# Patient Record
Sex: FEMALE | Race: WHITE | Employment: FULL TIME | URBAN - METROPOLITAN AREA
[De-identification: names, ages, dates, MRNs, and addresses within clinical notes are randomized per-mention and may not be internally consistent; named-entity substitution may affect disease eponyms.]

---

## 2018-05-14 ENCOUNTER — OFFICE VISIT (OUTPATIENT)
Dept: URGENT CARE | Facility: URGENT CARE | Age: 33
End: 2018-05-14
Payer: COMMERCIAL

## 2018-05-14 VITALS
HEART RATE: 73 BPM | WEIGHT: 177.5 LBS | DIASTOLIC BLOOD PRESSURE: 71 MMHG | SYSTOLIC BLOOD PRESSURE: 119 MMHG | TEMPERATURE: 98.3 F

## 2018-05-14 DIAGNOSIS — R07.0 THROAT PAIN: Primary | ICD-10-CM

## 2018-05-14 LAB
DEPRECATED S PYO AG THROAT QL EIA: NORMAL
SPECIMEN SOURCE: NORMAL

## 2018-05-14 PROCEDURE — 87081 CULTURE SCREEN ONLY: CPT | Performed by: PHYSICIAN ASSISTANT

## 2018-05-14 PROCEDURE — 99203 OFFICE O/P NEW LOW 30 MIN: CPT | Performed by: PHYSICIAN ASSISTANT

## 2018-05-14 PROCEDURE — 87880 STREP A ASSAY W/OPTIC: CPT | Performed by: PHYSICIAN ASSISTANT

## 2018-05-14 NOTE — PROGRESS NOTES
SUBJECTIVE:  Nara Salazar is a 32 year old female with a chief complaint of sore throat, upset stomach and V  this morning.  Worried about strep.  Onset of symptoms was 2 day(s) ago.    Course of illness: gradual onset and still present.  Severity mild  Current and Associated symptoms: negative other than stated above  Treatment measures tried include Tylenol/Ibuprofen, Fluids and Rest.  Predisposing factors include None.    Hx of asthma with no current sx    No past medical history on file.  No current outpatient prescriptions on file.     Social History   Substance Use Topics     Smoking status: Never Smoker     Smokeless tobacco: Never Used     Alcohol use Not on file       ROS:  Review of systems negative except as stated above.    OBJECTIVE:   /71 (BP Location: Right arm, Patient Position: Chair, Cuff Size: Adult Large)  Pulse 73  Temp 98.3  F (36.8  C) (Tympanic)  Wt 177 lb 8 oz (80.5 kg)  GENERAL APPEARANCE: healthy, alert and no distress  EYES: EOMI,  PERRL, conjunctiva clear  HENT: TM's normal bilaterally, nose and mouth without erythema, ulcers or lesions and oral mucous membranes moist, no erythema noted  NECK: supple, non-tender to palpation, no adenopathy noted  RESP: lungs clear to auscultation - no rales, rhonchi or wheezes  CV: regular rates and rhythm, normal S1 S2, no murmur noted  ABDOMEN:  soft, nontender, no HSM or masses and bowel sounds normal  SKIN: no suspicious lesions or rashes    Rapid Strep test is negative; await throat culture results.    ASSESSMENT:   Throat pain    PLAN:   Culture pending.  .   Symptomatic treat with gargles, lozenges, and OTC analgesic as needed. Follow-up with primary clinic if not improving.

## 2018-05-14 NOTE — LETTER
[facility]  [address]  [address]  [place] [address]  [phone]      May 14, 2018    RE:  Nara Salazar                                                                                                                                                       [address]    [place] [address]            To whom it may concern:    Nara Salazar is under my professional care for illness.  Patient has been seen and evaluated.  Please excuse from missed work for the past 2 shifts due to the illness.    May return after with no restrictions.        Sincerely,        Carmen Clarke    [facility]

## 2018-05-15 LAB
BACTERIA SPEC CULT: NORMAL
SPECIMEN SOURCE: NORMAL

## 2020-01-01 NOTE — MR AVS SNAPSHOT
"              After Visit Summary   2018    Nara Salazar    MRN: [id number]           Patient Information     Date Of Birth          1985        Visit Information        Provider Department      2018 2:40 PM Carmen Clarke PA-C [facility]        Today's Diagnoses     Throat pain    -  1       Follow-ups after your visit        Who to contact     If you have questions or need follow up information about today's clinic visit or your schedule please contact [facility] directly at [phone].  Normal or non-critical lab and imaging results will be communicated to you by [employer], letter or phone within 4 business days after the clinic has received the results. If you do not hear from us within 7 days, please contact the clinic through [employer] or phone. If you have a critical or abnormal lab result, we will notify you by phone as soon as possible.  Submit refill requests through [employer] or call your pharmacy and they will forward the refill request to us. Please allow 3 business days for your refill to be completed.          Additional Information About Your Visit        MyChart Information     [employer] lets you send messages to your doctor, view your test results, renew your prescriptions, schedule appointments and more. To sign up, go to [email or web address] . Click on \"Log in\" on the left side of the screen, which will take you to the Welcome page. Then click on \"Sign up Now\" on the right side of the page.     You will be asked to enter the access code listed below, as well as some personal information. Please follow the directions to create your username and password.     Your access code is: [email or web address]  Expires: 8/15/2018  6:50 PM     Your access code will  in 90 days. If you need help or a new code, please call your [place] clinic or [phone].        Care EveryWhere ID     This is your Care EveryWhere ID. This could be used by other " organizations to access your [place] medical records  [id number]        Your Vitals Were     Pulse Temperature                73 98.3  F (36.8  C) (Tympanic)           Blood Pressure from Last 3 Encounters:   05/14/18 119/71    Weight from Last 3 Encounters:   05/14/18 177 lb 8 oz (80.5 kg)              We Performed the Following     Beta strep group A culture     Strep, Rapid Screen        Primary Care Provider Office Phone # Fax #    Radha [facility] [phone] [address] [address]        Equal Access to Services     [facility]: Hadii aad ku hadasho Soomaali, waaxda luqadaha, qaybta kaalmada adeegyada, saundra teresa . So [facility] [phone].    ATENCIÓN: Si habla español, tiene a rodriguez disposición servicios gratuitos de asistencia lingüística. [facility] [phone].    We comply with applicable federal civil rights laws and [place] laws. We do not discriminate on the basis of race, color, national origin, age, disability, sex, sexual orientation, or gender identity.            Thank you!     Thank you for choosing [facility]  for your care. Our goal is always to provide you with excellent care. Hearing back from our patients is one way we can continue to improve our services. Please take a few minutes to complete the written survey that you may receive in the mail after your visit with us. Thank you!             Your Updated Medication List - Protect others around you: Learn how to safely use, store and throw away your medicines at www.disposemymeds.org.      Notice  As of 5/14/2018 11:59 PM    You have not been prescribed any medications.       Statement Selected

## 2020-02-04 ENCOUNTER — OFFICE VISIT (OUTPATIENT)
Dept: URGENT CARE | Facility: URGENT CARE | Age: 35
End: 2020-02-04
Payer: OTHER MISCELLANEOUS

## 2020-02-04 VITALS
TEMPERATURE: 98.1 F | OXYGEN SATURATION: 99 % | DIASTOLIC BLOOD PRESSURE: 72 MMHG | HEART RATE: 74 BPM | WEIGHT: 190 LBS | SYSTOLIC BLOOD PRESSURE: 100 MMHG

## 2020-02-04 DIAGNOSIS — T30.0 BURN, FIRST DEGREE: Primary | ICD-10-CM

## 2020-02-04 PROCEDURE — 99213 OFFICE O/P EST LOW 20 MIN: CPT | Performed by: PHYSICIAN ASSISTANT

## 2020-02-04 RX ORDER — ALBUTEROL SULFATE 90 UG/1
2 AEROSOL, METERED RESPIRATORY (INHALATION)
COMMUNITY
Start: 2012-10-04

## 2020-02-04 RX ORDER — FLUTICASONE PROPIONATE 50 MCG
SPRAY, SUSPENSION (ML) NASAL
COMMUNITY
Start: 2012-09-01

## 2020-02-04 NOTE — PATIENT INSTRUCTIONS
Patient Education     First-Degree Burn  A burn occurs when skin is exposed to too much heat, sun, or harsh chemicals. A first-degree burn (superficial burn) causes mainly redness. It heals in a few days.  Home care  Follow these guidelines when caring for yourself at home:    Use pain medicine as directed. You may use over-the-counter medicine to control pain if no pain medicine was prescribed. If you have chronic liver or kidney disease, talk with your healthcare provider before using acetaminophen or ibuprofen. Also talk with your provider if you've had a stomach ulcer or GI bleeding.    On the first day, you may put a cool compress on the burn to relieve pain. You can use a small towel soaked in cool water as a cool compress.    Numbing medicines for sunburn can be used for temporary relief of the burning feeling. These medicines include lidocaine or benzocaine. You don t need a prescription for them.    Moisturizers with aloe vera can help soothe the burn.    Don't pick or scratch at the affected areas. Use over-the-counter medicines like diphenhydramine for itching. This medicine is taken by mouth.    Since you don't have an open wound, you don't need to use antibiotic cream or ointment. Sometimes an infection may occur even with proper treatment. Be sure to check the burn daily for the signs of infection listed below.    Wear a hat, sunscreen, and long sleeves while in the sun.    Wear loose-fitting clothing until the burn heals.  Follow-up care  Follow up with your healthcare provider, or as advised. Most first-degree burns heal well without complications.  When to seek medical advice  Call your healthcare provider right away if any of these signs of infection occur:    Fever of 100.4 F (38 C) or higher, or as directed by your healthcare provider    Pain gets worse    Redness or swelling gets worse    Pus comes from the burn    Red streaks in your skin come from the burn    Wound doesn't appear to be  healing  Date Last Reviewed: 12/1/2016 2000-2019 [employer]. [address] [place] [address], [place] [address]. All rights reserved. This information is not intended as a substitute for professional medical care. Always follow your healthcare professional's instructions.

## 2020-02-04 NOTE — PROGRESS NOTES
EMP:[facility]  DOI:2/4/20 at approximately 2am        SUBJECTIVE:  Nara Salazar is a 34 year old female who presents to the clinic today for a rash.  Onset of rash was 7 hour(s) ago.   Rash is sudden onset.  Location of the rash: hand.  Quality/symptoms of rash: burning   Symptoms are mild and rash seems to be stable.  Previous history of a similar rash? No  Recent exposure history: burn at work from hot tray    Associated symptoms include: nothing.    No past medical history on file.  Current Outpatient Medications   Medication Sig Dispense Refill     albuterol (PROAIR HFA/PROVENTIL HFA/VENTOLIN HFA) 108 (90 Base) MCG/ACT inhaler Inhale 2 puffs into the lungs       budesonide (PULMICORT FLEXHALER) 90 MCG/ACT inhaler Inhale 2 Puffs into the lungs two times a day.       cetirizine HCl 10 MG CAPS        fluticasone (FLONASE) 50 MCG/ACT nasal spray USE 2 SPRAYS IN EACH NOSTRIL ONCE DAILY. SHAKE WELL BEFORE USE.       Social History     Tobacco Use     Smoking status: Never Smoker     Smokeless tobacco: Never Used   Substance Use Topics     Alcohol use: Not on file       ROS:  10 point ROS negative except as listed above      EXAM:   /72   Pulse 74   Temp 98.1  F (36.7  C) (Tympanic)   Wt 86.2 kg (190 lb)   SpO2 99%   GENERAL: alert, no acute distress.  SKIN: mild first degree burn to palm of hand, no visible rash  GENERAL APPEARANCE: healthy, alert and no distress  EYES: EOMI,  PERRL, conjunctiva clear  NECK: supple, non-tender to palpation, no adenopathy noted  RESP: lungs clear to auscultation - no rales, rhonchi or wheezes  CV: regular rates and rhythm, normal S1 S2, no murmur noted    ASSESSMENT:  (T30.0) Burn, first degree  (primary encounter diagnosis)  Comment: no evidence of tissue damage  Plan: continue to use cool water to area, follow up with changes    Follow up with PCP if symptoms worsen or fail to improve      Patient Instructions     Patient Education     First-Degree Burn  A burn occurs when  skin is exposed to too much heat, sun, or harsh chemicals. A first-degree burn (superficial burn) causes mainly redness. It heals in a few days.  Home care  Follow these guidelines when caring for yourself at home:    Use pain medicine as directed. You may use over-the-counter medicine to control pain if no pain medicine was prescribed. If you have chronic liver or kidney disease, talk with your healthcare provider before using acetaminophen or ibuprofen. Also talk with your provider if you've had a stomach ulcer or GI bleeding.    On the first day, you may put a cool compress on the burn to relieve pain. You can use a small towel soaked in cool water as a cool compress.    Numbing medicines for sunburn can be used for temporary relief of the burning feeling. These medicines include lidocaine or benzocaine. You don t need a prescription for them.    Moisturizers with aloe vera can help soothe the burn.    Don't pick or scratch at the affected areas. Use over-the-counter medicines like diphenhydramine for itching. This medicine is taken by mouth.    Since you don't have an open wound, you don't need to use antibiotic cream or ointment. Sometimes an infection may occur even with proper treatment. Be sure to check the burn daily for the signs of infection listed below.    Wear a hat, sunscreen, and long sleeves while in the sun.    Wear loose-fitting clothing until the burn heals.  Follow-up care  Follow up with your healthcare provider, or as advised. Most first-degree burns heal well without complications.  When to seek medical advice  Call your healthcare provider right away if any of these signs of infection occur:    Fever of 100.4 F (38 C) or higher, or as directed by your healthcare provider    Pain gets worse    Redness or swelling gets worse    Pus comes from the burn    Red streaks in your skin come from the burn    Wound doesn't appear to be healing  Date Last Reviewed: 12/1/2016 2000-2019 [employer]. [address] [place] [address], [place] [address]. All rights reserved. This information is not intended as a substitute for professional medical care. Always follow your healthcare professional's instructions.

## 2020-03-11 ENCOUNTER — HEALTH MAINTENANCE LETTER (OUTPATIENT)
Age: 35
End: 2020-03-11

## 2020-03-15 ENCOUNTER — VIRTUAL VISIT (OUTPATIENT)
Dept: FAMILY MEDICINE | Facility: OTHER | Age: 35
End: 2020-03-15

## 2020-03-15 NOTE — PROGRESS NOTES
"Date: 03/15/2020 12:23:09  Clinician: Jessica Guerrero  Clinician NPI: [id number]  Patient: Nara Salazar  Patient : 1985  Patient Address: [address] [place] [address]  Patient Phone: [phone]  Visit Protocol: URI  Patient Summary:  Nara is a 34 year old ( : 1985 ) female who initiated a Visit for COVID-19 (Coronavirus) evaluation and screening. When asked the question \"Please sign me up to receive news, health information and promotions. \", Nara responded \"No\".    Nara states her symptoms started gradually 3-6 days ago. After her symptoms started, they improved and then got worse again.   Her symptoms consist of malaise, a headache, facial pain or pressure, a sore throat, and nasal congestion. Nara also feels feverish but was unable to measure her temperature.   Symptom details     Nasal secretions: The color of her mucus is yellow, clear, and white.    Sore throat: Nara reports having mild throat pain (1-3 on a 10 point pain scale), does not have exudate on her tonsils, and can swallow liquids. She is not sure if the lymph nodes in her neck are enlarged. A rash has not appeared on the skin since the sore throat started.     Facial pain or pressure: The facial pain or pressure does not feel worse when bending or leaning forward.     Headache: She states the headache is mild (1-3 on a 10 point pain scale).      Nara denies having ear pain, rhinitis, myalgias, chills, wheezing, cough, and teeth pain. She also denies having a sinus infection within the past year, having recent facial or sinus surgery in the past 60 days, and taking antibiotic medication for the symptoms. She is not experiencing dyspnea.   Precipitating events  Within the past week, Nara has not been exposed to someone with strep throat.   Pertinent COVID-19 (Coronavirus) information  Nara has traveled internationally or to the areas where COVID-19 (Coronavirus) is widespread in the last 14 days before the start " of her symptoms. Countries or locations traveled as reported by the patient (free text): [facility]   Nara has not had close contact with a suspected or laboratory-confirmed COVID-19 patient within 14 days of symptom onset.   Nara is not a healthcare worker and does not work in a healthcare facility.   Pertinent medical history  Nara does not get yeast infections when she takes antibiotics.   Nara needs a return to work/school note.   Weight: 190 lbs   Nara does not smoke or use smokeless tobacco.   She denies pregnancy and denies breastfeeding. She has menstruated in the past month.   Weight: 190 lbs    MEDICATIONS: No current medications, ALLERGIES: Novocain  Clinician Response:  Dear Nara,  Based on the information provided, you have a viral upper respiratory infection, otherwise known as a cold. Symptoms vary from person to person, but can include sneezing, coughing, a runny nose, sore throat, and headache and range from mild to severe.  Unfortunately, there are no medications that can cure a cold, so treatment is focused on controlling symptoms as much as possible. Most people gradually feel better until symptoms are gone in 1-2 weeks.  Medication information  Because you have a viral infection, antibiotics will not help you get better. Treating a viral infection with antibiotics could actually make you feel worse.  Unless you are allergic to the over-the-counter medication(s) below, I recommend using:       Acetaminophen (Tylenol or store brand) oral tablet. Take 1-2 tablets by mouth every 4-6 hours to help with the discomfort.      Ibuprofen (Advil or store brand) 200 mg oral tablet. Take 1-3 tablets (200-600 mg) by mouth every 8 hours to help with the discomfort. Make sure to take the ibuprofen with food. Do not exceed 2400 mg in 24 hours.    An antihistamine such as Benadryl, Claritin, or store brand.    A decongestant such as Sudafed PE or store brand.     Over-the-counter medications do not  require a prescription. Ask the pharmacist if you have any questions.  Self care  The following tips will keep you as comfortable as possible while you recover:     Rest    Drink plenty of water and other liquids    Take a hot shower to loosen congestion    Use throat lozenges    Gargle with warm salt water (1/4 teaspoon of salt per 8 ounce glass of water)    Suck on frozen items such as popsicles or ice cubes    Drink hot tea with lemon and honey     When to seek care  Please be seen in a clinic or urgent care if new symptoms develop, or symptoms become worse.  Call 911 or go to the emergency room if you feel that your throat is closing off, you suddenly develop a rash, you are unable to swallow fluids, you are drooling, or you are having difficulty breathing.  Additional treatment plan   Dear Nara,  Based on the information you have provided, it does not appear you need Coronavirus (COVID-19) testing.   At this time, we recommend testing primarily for those people who have symptoms of cough and fever and have either traveled to a known area of infection or have been exposed to someone with laboratory confirmed Coronavirus by close contact.   Coronavirus - General Information:   The coronavirus infection starts within 14 days of an exposure.  Symptoms are those of a respiratory infection (such as fever, cough).   If you have not had symptoms by day 15, you should be considered uninfected by coronavirus.   Coronavirus - Symptoms:    The coronavirus can cause a respiratory illness, such as bronchitis or pneumonia.  The most common symptoms are: cough, fever, and shortness of breath.   Other symptoms are: body aches, chills, diarrhea, fatigue, headache, runny nose, and sore throat   Coronavirus - Exposure Risk Factors:   Exposure to a person who has been diagnosed with coronavirus.  Travel from an area with recent local transmission of coronavirus.  The CDC (www.cdc.gov) has the most up-to-date list of where the  coronavirus outbreak is occurring.   Coronavirus - Spreading:    The virus likely spreads through respiratory droplets produced when a person coughs or sneezes. These respiratory droplets can travel approximately 6 feet and can remain on surfaces. Common disinfectants will kill the virus.  The CDC currently does not recommend healthy people wear masks.   Coronavirus - Protect Yourself:    Avoid close contact with people known to have this new coronavirus infection.  Wash hands often with soap and water or alcohol-based hand .  Avoid touching the eyes, nose or mouth.   Thank you for limiting contact with others, wearing a simple mask to cover your cough, practice good hand hygiene habits and accessing our virtual services where possible to limit the spread of this virus.  For more information about COVID19 and options for caring for yourself at home, please visit the CDC website at https://www.cdc.gov/coronavirus/2019-ncov/about/steps-when-sick.html   For more options for care at [facility], please visit our website at [email or web address]     Diagnosis: Cough  Diagnosis ICD: R05

## 2020-03-18 ENCOUNTER — VIRTUAL VISIT (OUTPATIENT)
Dept: FAMILY MEDICINE | Facility: OTHER | Age: 35
End: 2020-03-18

## 2020-03-18 NOTE — PROGRESS NOTES
"Date: 2020 14:39:08  Clinician: Merna Maxwell  Clinician NPI: [id number]  Patient: Nara Salazar  Patient : 1985  Patient Address: [address] [place] [id number]  Patient Phone: [phone]  Visit Protocol: URI  Patient Summary:  Nara is a 34 year old ( : 1985 ) female who initiated a Visit for cold, sinus infection, or influenza. When asked the question \"Please sign me up to receive news, health information and promotions. \", Nara responded \"No\".    Nara states her symptoms started gradually 3-6 days ago. After her symptoms started, they improved and then got worse again.   Her symptoms consist of a sore throat, a cough, nasal congestion, ear pain, malaise, a headache, rhinitis, enlarged lymph nodes, facial pain or pressure, myalgia, and tooth pain. She is experiencing difficulty breathing due to nasal congestion but she is not short of breath.   Symptom details     Nasal secretions: The color of her mucus is clear, yellow, and white.    Cough: Nara coughs every 5-10 minutes and her cough is not more bothersome at night. Phlegm comes into her throat when she coughs. She believes her cough is caused by post-nasal drip. The color of the phlegm is yellow, white, and clear.     Sore throat: Nara reports having moderate throat pain (4-6 on a 10 point pain scale), does not have exudate on her tonsils, and can swallow liquids. The lymph nodes in her neck are enlarged. A rash has not appeared on the skin since the sore throat started.     Facial pain or pressure: The facial pain or pressure does not feel worse when bending or leaning forward.     Headache: She states the headache is mild (1-3 on a 10 point pain scale).     Tooth pain: The tooth pain is not caused by a cavity, recent dental work, or other mouth problems.      Nara denies having wheezing, fever, and chills. She also denies taking antibiotic medication for the symptoms, having a sinus infection within the past year, " and having recent facial or sinus surgery in the past 60 days.   Precipitating events  Within the past week, Nara has not been exposed to someone with strep throat. She has not recently been exposed to someone with influenza. Nara has not been in close contact with any high risk individuals.   Pertinent COVID-19 (Coronavirus) information  Nara has traveled internationally or to the areas where COVID-19 (Coronavirus) is widespread in the last 14 days before the start of her symptoms. Countries or locations traveled as reported by the patient (free text): [facility], [facility] and [facility]   Nara has not had a close contact with a laboratory-confirmed COVID-19 patient within 14 days of symptom onset. She also has not had a close contact with a suspected COVID-19 patient within 14 days of symptom onset.   Nara is not a healthcare worker and does not work in a healthcare facility.   Pertinent medical history  Nara does not get yeast infections when she takes antibiotics.   Nara needs a return to work/school note.   Weight: 190 lbs   Nara does not smoke or use smokeless tobacco.   She denies pregnancy and denies breastfeeding. She has menstruated in the past month.   Additional information as reported by the patient (free text): I did this assessment on Sunday and was told it's just a cold, but I have been sick since Friday with my symptoms changing from a sore throat to sinus cold back to sore throat and now back to sinus cold. Is this just a tough cold I can't seem to shake or should I be worried? I feel mostly better, but I tried going to work today and after my shift I felt sick a over again.   Weight: 190 lbs    MEDICATIONS: No current medications, ALLERGIES: Novocain  Clinician Response:  Dear Nara,   Based on the information you have provided, you do have symptoms that are consistent with Coronavirus (COVID-19).  The coronavirus causes mild to severe respiratory illness with the most common symptoms  including fever, cough and difficulty breathing. Unfortunately, many viruses cause similar symptoms and it can be difficult to distinguish between viruses, especially in mild cases, so we are presuming that anyone with cough or fever has coronavirus at this time.  Coronavirus/COVID-19 has reached the point of community spread in [place], meaning that we are finding the virus in people with no known exposure risk for leslie the virus. Given the increasing commonness of coronavirus in the community we are no longer testing patients who are not critically ill.  If you are a health care worker, you should refer to your employee health office for instructions about returning to work.  For everyone else who has cough or fever, you should assume you are infected with coronavirus. Accordingly, you should self-quarantine for seven days from the first day your symptoms started OR 72 hours after your cough and fever completely resolve - WHICHEVER is LONGER. You should call if you find increasing shortness of breath, wheezing or sustained fever above 101.5. If you are significantly short of breath or experience chest pain you should call 911 or report to the nearest emergency department for urgent evaluation.    Isolate yourself at home.   Do Not allow any visitors  Do Not go to work or school  Do Not go to [employer],  centers, shopping, or other public places.  Do Not shake hands.  Avoid close contact with others (hugging, kissing).   Protect Others:    Cover Your Mouth and Nose with a mask, disposable tissue or wash cloth to avoid spreading germs to others.  Wash your hands and face frequently with soap and water.   If you develop significant shortness of breath that prevents you from doing normal activities, please call 911 or proceed to the nearest emergency room and alert them immediately that you have been in self-isolation for possible coronavirus.   For more information about COVID19 and options for caring  for yourself at home, please visit the CDC website at https://www.cdc.gov/coronavirus/2019-ncov/about/steps-when-sick.htmlFor more options for care at [facility], please visit our website at [email or web address]     Diagnosis: Pain in throat  Diagnosis ICD: R07.0

## 2020-03-23 ENCOUNTER — VIRTUAL VISIT (OUTPATIENT)
Dept: FAMILY MEDICINE | Facility: OTHER | Age: 35
End: 2020-03-23

## 2020-03-23 NOTE — PROGRESS NOTES
"Date: 2020 14:22:50  Clinician: Arleth Stiles  Clinician NPI: [id number]  Patient: Nara Salazar  Patient : 1985  Patient Address: [address] [place] [address]  Patient Phone: [phone]  Visit Protocol: URI  Patient Summary:  Nara is a 34 year old ( : 1985 ) female who initiated a Visit for cold, sinus infection, or influenza. When asked the question \"Please sign me up to receive news, health information and promotions. \", Nara responded \"No\".    Nara states her symptoms started gradually 10-13 days ago. After her symptoms started, they improved and then got worse again.   Her symptoms consist of myalgia, wheezing, a sore throat, a cough, nasal congestion, and malaise. She is experiencing difficulty breathing due to nasal congestion but she is not short of breath.   Symptom details     Nasal secretions: The color of her mucus is yellow.    Cough: Nara coughs every 5-10 minutes and her cough is not more bothersome at night. Phlegm comes into her throat when she coughs. She believes her cough is caused by post-nasal drip. The color of the phlegm is yellow.     Sore throat: Nara reports having moderate throat pain (4-6 on a 10 point pain scale), does not have exudate on her tonsils, and can swallow liquids. She is not sure if the lymph nodes in her neck are enlarged. A rash has not appeared on the skin since the sore throat started.     Wheezing: Nara has been diagnosed with asthma. The wheezing does not interfere with her normal daily activities.     Nara denies having ear pain, rhinitis, facial pain or pressure, chills, teeth pain, headache, and fever. She also denies having a sinus infection within the past year, taking antibiotic medication for the symptoms, and having recent facial or sinus surgery in the past 60 days.   Precipitating events  Within the past week, Nara has not been exposed to someone with strep throat. She has not recently been exposed to " someone with influenza. Nara has not been in close contact with any high risk individuals.   Pertinent COVID-19 (Coronavirus) information  Nara has traveled internationally or to the areas where COVID-19 (Coronavirus) is widespread, including cruise ship travel in the last 14 days before the start of her symptoms. Countries or locations traveled as reported by the patient (free text): [place], [place] and [place], [place]   Nara has not had a close contact with a laboratory-confirmed COVID-19 patient within 14 days of symptom onset. She also has not had a close contact with a suspected COVID-19 patient within 14 days of symptom onset.   Nara is not a healthcare worker and does not work in a healthcare facility.   Pertinent medical history  Nara does not get yeast infections when she takes antibiotics.   Nara needs a return to work/school note.   Weight: 190 lbs   Nara does not smoke or use smokeless tobacco.   She denies pregnancy and denies breastfeeding. Her last period was over a month ago.   Additional information as reported by the patient (free text): This will be my 3rd visit, I have been sick since the 13th, with my symptoms getting better than getting worse at least 3 times.   My first visit it was suggested this is a cold. My second said that just encase to stay home until symptom free for 72 hours.  I'm usually not sick this long, and I feel like this may be a sinus infection. At this point I don't know if it's worth it to try antibiotics or to let this thing run it's course and hopefully be better by the weekend?   Weight: 190 lbs    MEDICATIONS: No current medications, ALLERGIES: Novocain  Clinician Response:  Dear Nara,  Based on the information provided, you have acute bacterial sinusitis, also known as a sinus infection. Sinus infections are caused by bacteria or a virus and symptoms are almost always identical. The difference between the 2 types of infections is timing.  Sinus infections start as  viral infections and symptoms improve on their own in about 7 days. If symptoms have not improved after 7 days or have even worsened, a bacterial infection may have developed.  Medication information  I am prescribing:       Fluticasone 50 mcg/actuation nasal spray. Inhale 2 sprays in each nostril 1 time per day; after 1 week, may adjust to 1 - 2 sprays in each nostril 1 time per day. This medication takes several days to start working, so keep taking it even if it doesn't help right away. There are no refills with this prescription.      Doxycycline hyclate 100 mg oral tablet. Take 1 tablet by mouth 2 times per day for 7 days. There are no refills with this prescription.     Certain antibiotics such as Doxycycline, levofloxacin, and moxifloxacin can cause your skin to be more sensitive to the sun. Exposure to the sun when taking these antibiotics may cause skin rash, itching, redness, or a severe sunburn. Be sure to avoid prolonged or direct exposure to the sun, especially between 10 am and 3 pm, if possible. Wear protective clothing and use sunscreen of SPF 30 or higher when you are outdoors.  Yeast infections can be a common side effect of antibiotics. The most common symptom of a yeast infection is itchiness in and around the vagina. Other signs and symptoms include burning, redness, or a thick, white vaginal discharge that looks like cottage cheese and does not have a bad smell.  If you become pregnant during this course of treatment, stop taking the medication and contact your primary care provider.  Unless you are allergic to the over-the-counter medication(s) below, I recommend using:   A sinus irrigation kit such as Sinus Rinse, Neti Pot, SinuCleanse, or store brand. Be sure to use sterile or previously boiled water to prevent unwanted infections.   Over-the-counter medications do not require a prescription. Ask the pharmacist if you have any questions.  Self care  Steps you can take to be as comfortable as  possible:     Rest.    Drink plenty of water and other liquids.    Take a hot shower to loosen congestion    Use throat lozenges.    Gargle with warm salt water (1/4 teaspoon of salt per 8 ounce glass of water).    Suck on frozen items such as popsicles or ice cubes.    Drink hot tea with lemon and honey.    Take a spoonful of honey to reduce your cough.     When to seek care  Please be seen in a clinic or urgent care if any of the following occur:     Symptoms do not start to improve after 3 days of treatment    New symptoms develop, or symptoms become worse     Please be seen in a clinic or urgent care if new symptoms develop, or symptoms become worse.  It is possible to have an allergic reaction to an antibiotic even if you have not had one in the past. If you notice a new rash, significant swelling, or difficulty breathing, stop taking this medication immediately and go to a clinic or urgent care.  Call 911 or go to the emergency room if you feel that your throat is closing off, you suddenly develop a rash, you are unable to swallow fluids, you are drooling, or you are having difficulty breathing.  COVID-19 (Coronavirus) General Information  With the increase in the number of COVID-19 (Coronavirus) cases, we understand you may have some questions. Below is some helpful information on COVID-19 (Coronavirus).  How can I protect myself and others from the COVID-19 (Coronavirus)?  Because there is currently no vaccine to prevent infection, the best way to protect yourself is to avoid being exposed to this virus. Put distance between yourself and other people if COVID-19 (Coronavirus) is spreading in your community. The virus is thought to spread mainly from person-to-person.     Between people who are in close contact with one another (within about 6 about) for a prolonged period (10 minutes or longer).    Through respiratory droplets produced when an infected person coughs or sneezes.     The CDC recommends the  following additional steps to protect yourself and others:     Wash your hands often with soap and water for at least 20 seconds, especially after blowing your nose, coughing, or sneezing; going to the bathroom; and before eating or preparing food.  Use an alcohol-based hand  that contains at least 60 percent alcohol if soap and water are not available.        Avoid touching your eyes, nose and mouth with unwashed hands.    Avoid close contact with people who are sick.    Stay home when you are sick.    Cover your cough or sneeze with a tissue, then throw the tissue in the trash.    Clean and disinfect frequently touched objects and surfaces.     You can help stop COVID-19 (Coronavirus) by knowing the signs and symptoms:     Fever    Cough    Shortness of breath     Contact your healthcare provider if   Develop symptoms   AND   Have been in close contact with a person known to have COVID-19 (Coronavirus) or live in or have recently traveled from an area with ongoing spread of COVID-19 (Coronavirus). Call ahead before you go to a doctor's office or emergency room. Tell them about your recent travel and your symptoms.   For the most up to date information, visit the CDC's website.  Self-monitoring  Self-monitoring means people should monitor themselves for fever by taking their temperatures twice a day and remain alert for a cough or difficulty breathing.  It is important to check your health two times each day for 14 days after a potential exposure to a person with COVID-19 (Coronavirus) or after travel from a location where COVID-19 (Coronavirus) is widespread. If you have been exposed to a person with COVID-19 (Coronavirus), it may take up to 14 days to know if you will get sick. Follow the steps below to check and record your health.     Take your temperature with a thermometer twice a day, once in the morning and once in the evening, and watch for a cough or difficulty breathing for 14 days.    Write  down your temperature and any COVID-19 symptoms you may have: feeling feverish, coughing, or difficulty breathing.    Stay home from work or school.    Do not take public transportation, taxis, or ride-shares.    Avoid crowded places (such as shopping centers and movie theaters) and limit your activities in public.    Keep your distance from others (about 6 feet or 2 meters).    If you get sick with fever, cough, or trouble breathing, contact your healthcare provider and tell them about your recent travel and/or your symptoms.    If you need to seek medical care for other reasons, such as dialysis, call ahead to your doctor and tell them about your recent travel.     Steps to help prevent the spread of COVID-19 (Coronavirus) if you are sick  If you are sick with COVID-19 (Coronavirus) or suspect you are infected with the virus that causes COVID-19 (Coronavirus), follow the steps below to help prevent the disease from spreading&nbsp;to people in your home and community.     Stay home except to get medical care. Home isolation may be started in consultation with your healthcare clinician.    Separate yourself from other people and animals in your home.    Call ahead before visiting your doctor if you have a medical appointment.    Wear a facemask when you are around other people.    Cover your cough and sneezes.    Clean your hands often.    Avoid sharing personal household items.    Clean and disinfect frequently touched objects and surfaces everyday.    You will need to have someone drop off medications or household supplies (if needed) at your house without coming inside or in contact with you or others living in your house.    Monitor your symptoms and seek prompt medical care if your illness is worsening (e.g. Difficulty breathing).    Discontinue home isolation only in consultation with your healthcare provider.     For more detailed and up to date information on what to do if you are sick, visit this link: What  "to Do If You Are Sick With Coronavirus Disease 2019 (COVID-19).  Do I need to be tested for COVID-19 (Coronavirus)?     At this time, the limited number of available tests are controlled by the state and local health departments and are being reserved for more seriously ill patients, those with known exposure to confirmed patients, and those with recent travel (within 14 days) to countries with high rates of COVID-19 (Coronavirus).    Decisions on which patients receive testing will be based on the local spread of COVID-19 (Coronavirus) as well as the symptoms. Your healthcare provider will make the final decision on whether you should be tested.    In the meantime, if you have concerns that you may have been exposed, it is reasonable to practice \"social distancing.\"&nbsp; If you are ill with a cold or flu-like illness, please monitor your symptoms and reach out to your healthcare provider if your symptoms worsen.    For more up to date information, visit this link: COVID-19 (Coronavirus) Frequently Asked Questions and Answers.      Diagnosis: Acute bacterial sinusitis  Diagnosis ICD: J01.90  Prescription: fluticasone 50 mcg/actuation nasal spray,suspension 1 120 spray aerosol with adapter (grams), 30 days supply. Inhale 2 sprays in each nostril 1 time per day; after 1 week, may adjust to 1 - 2 sprays in each nostril 1 time per day.. Refills: 0, Refill as needed: no, Allow substitutions: yes  Prescription: doxycycline hyclate 100 mg oral tablet 14 tablet, 7 days supply. Take 1 tablet by mouth 2 times per day for 7 days. Refills: 0, Refill as needed: no, Allow substitutions: yes  [id number] - [phone] - [address] [place] [address]  "

## 2020-05-10 ENCOUNTER — VIRTUAL VISIT (OUTPATIENT)
Dept: FAMILY MEDICINE | Facility: OTHER | Age: 35
End: 2020-05-10

## 2020-05-10 NOTE — PROGRESS NOTES
"Date: 05/10/2020 14:54:38  Clinician: Jonas Garcia  Clinician NPI: [id number]  Patient: Nara Salazar  Patient : 1985  Patient Address: [address] [place] [address]  Patient Phone: [phone]  Visit Protocol: URI  Patient Summary:  Nara is a 34 year old ( : 1985 ) female who initiated a Visit for cold, sinus infection, or influenza. When asked the question \"Please sign me up to receive news, health information and promotions. \", Nara responded \"No\".    Nara states her symptoms started 1-2 days ago.   Her symptoms consist of myalgia, a sore throat, a cough, vomiting, nausea, diarrhea, a headache, malaise, and chills.   Symptom details     Cough: Nara coughs a few times an hour and her cough is not more bothersome at night. Phlegm does not come into her throat when she coughs. She does not believe her cough is caused by post-nasal drip.     Sore throat: Nara reports having mild throat pain (1-3 on a 10 point pain scale), does not have exudate on her tonsils, and can swallow liquids. The lymph nodes in her neck are not enlarged. A rash has not appeared on the skin since the sore throat started.     Headache: She states the headache is mild (1-3 on a 10 point pain scale).      Nara denies having fever, rhinitis, facial pain or pressure, nasal congestion, teeth pain, ageusia, anosmia, ear pain, wheezing, and enlarged lymph nodes. She also denies taking antibiotic medication for the symptoms and having recent facial or sinus surgery in the past 60 days. She is not experiencing dyspnea.   Precipitating events  Within the past week, Nara has not been exposed to someone with strep throat. She has not recently been exposed to someone with influenza. Nara has not been in close contact with any high risk individuals.   Pertinent COVID-19 (Coronavirus) information  Nara does not work or volunteer as healthcare worker or a  and does not work or volunteer in a healthcare " facility.   She does not live with a healthcare worker.   Nara has not had a close contact with a laboratory-confirmed COVID-19 patient within 14 days of symptom onset. She also has not had a close contact with a suspected COVID-19 patient within 14 days of symptom onset.   Pertinent medical history  Nara does not get yeast infections when she takes antibiotics.   Nara needs a return to work/school note.   Weight: 190 lbs   Nara does not smoke or use smokeless tobacco.   She denies pregnancy and denies breastfeeding. She has menstruated in the past month.   Weight: 190 lbs    MEDICATIONS: No current medications, ALLERGIES: Novocain  Clinician Response:  Dear Nara,   Dear Nara  Your symptoms show that you may have coronavirus (COVID-19). This illness can cause fever, cough and trouble breathing. Many people get a mild case and get better on their own. Some people can get very sick.   Will I be tested for COVID-19?  Because we have limited testing supplies, we do not test everyone who is at low risk. We are testing if:    You are very ill. For example, you're on chemotherapy, dialysis or home hospice care. (Contact your specialty clinic or program.)   You live in a nursing home or other long-term care facility. (Talk to your nurse manager or medical director.)   You're a health care worker. ([facility] employees contact our employee health office for testing.)   We are performing limited curbside testing for healthcare/first responders and people with medical problems that put them at increased risk. It does not appear by the [facility] information you submitted that you meet any of these criteria. If there are medical problems that we did not know about, please repeat an [facility] visit and let us know what medical conditions you have.   How can I protect others?  Without a test, we can't know for sure that you have COVID-19. For safety, it's very important to follow these rules.  First, stay home and away from  others (self-isolate) until:   You've had no fever---and no medicine that reduces fever---for 3 full days (72 hours). And...    Your other symptoms have gotten better. For example, your cough or breathing has improved. And...   At least 10 days have passed since your symptoms started.   During this time:   Don't go to work, school or anywhere else.    Stay away from others in your home. No hugging, kissing or shaking hands.   Don't let anyone visit.   Cover your mouth and nose with a mask, tissue or wash cloth to avoid spreading germs.   Wash your hands and face often. Use soap and water.   How can I take care of myself?   1.Take Tylenol (acetaminophen) for fever or pain. If you have liver or kidney problems, ask your family doctor if it's okay to take Tylenol.        Adults can take either:    650 mg (two 325 mg pills) every 4 to 6 hours, or...   1,000 mg (two 500 mg pills) every 8 hours as needed.    Note: Don't take more than 3,000 mg in one day.   For children, check the Tylenol bottle for the right dose. The dose is based on the child's age or weight.   2.If you have other health problems (like cancer, heart failure, an organ transplant or severe kidney disease): Call your specialty clinic if you don't feel better in the next 2 days.       3.Know when to call 911: If your breathing is so bad that it keeps you from doing normal activities, call 911 or go to the emergency room. Tell them that you've been staying home and may have COVID-19.   Where can I get more information?  To learn more about COVID-19 and how to care for yourself at home, please visit the CDC website at https://www.cdc.gov/coronavirus/2019-ncov/about/steps-when-sick.html.  For more about your care at [facility], please visit [email or web address]   If you are interested in becoming part of a [facility] clinic trial related to COVID19 please go to [email or web address] for information, if you  qualify.     Diagnosis: Acute upper respiratory infection, unspecified  Diagnosis ICD: J06.9

## 2020-05-10 NOTE — PROGRESS NOTES
"Date: 05/10/2020 15:09:43  Clinician: Suhail Stark  Clinician NPI: [id number]  Patient: Nara Salazar  Patient : 1985  Patient Address: [address] [place] [address]  Patient Phone: [phone]  Visit Protocol: URI  Patient Summary:  Nara is a 34 year old ( : 1985 ) female who initiated a Visit for cold, sinus infection, or influenza. When asked the question \"Please sign me up to receive news, health information and promotions. \", Nara responded \"No\".    Nara states her symptoms started 1-2 days ago.   Her symptoms consist of myalgia, a sore throat, a cough, a headache, and malaise.   Symptom details     Cough: Nara coughs a few times an hour and her cough is not more bothersome at night. Phlegm does not come into her throat when she coughs. She does not believe her cough is caused by post-nasal drip.     Sore throat: Nara reports having mild throat pain (1-3 on a 10 point pain scale), does not have exudate on her tonsils, and can swallow liquids. The lymph nodes in her neck are not enlarged. A rash has not appeared on the skin since the sore throat started.     Headache: She states the headache is mild (1-3 on a 10 point pain scale).      Nara denies having fever, rhinitis, facial pain or pressure, nasal congestion, vomiting, nausea, teeth pain, ageusia, anosmia, diarrhea, ear pain, wheezing, enlarged lymph nodes, and chills. She also denies taking antibiotic medication for the symptoms and having recent facial or sinus surgery in the past 60 days. She is not experiencing dyspnea.   Precipitating events  Within the past week, Nara has not been exposed to someone with strep throat. She has not recently been exposed to someone with influenza. Nara has not been in close contact with any high risk individuals.   Pertinent COVID-19 (Coronavirus) information  Nara does not work or volunteer as healthcare worker or a  and does not work or volunteer in a healthcare " "facility.   She does not live with a healthcare worker.   Nara has not had a close contact with a laboratory-confirmed COVID-19 patient within 14 days of symptom onset. She also has not had a close contact with a suspected COVID-19 patient within 14 days of symptom onset.   Pertinent medical history  Nara does not get yeast infections when she takes antibiotics.   Nara needs a return to work/school note.   Weight: 190 lbs   Nara does not smoke or use smokeless tobacco.   She denies pregnancy and denies breastfeeding. She has menstruated in the past month.   Additional information as reported by the patient (free text): There's no way it's Covid-19, I just feel run down, tired and I threw up a couple times two days ago; so I took a couple days off work and I need a doctors note to return. The last assessment didn't have a doctors note, just said \"maybe you have covid and stay home for 10 days\".     I'm just tired and worried and maybe I worked myself up to sickness but it's not covid and I need a note to return to work.   Weight: 190 lbs    MEDICATIONS: No current medications, ALLERGIES: Novocain  Clinician Response:  Dear Nara,   Dear aNra  Your symptoms show that you may have coronavirus (COVID-19). This illness can cause fever, cough and trouble breathing. Many people get a mild case and get better on their own. Some people can get very sick.   Will I be tested for COVID-19?  Because we have limited testing supplies, we do not test everyone who is at low risk. We are testing if:    You are very ill. For example, you're on chemotherapy, dialysis or home hospice care. (Contact your specialty clinic or program.)   You live in a nursing home or other long-term care facility. (Talk to your nurse manager or medical director.)   You're a health care worker. ([facility] employees contact our employee health office for testing.)   We are performing limited curbside testing for healthcare/first responders and " people with medical problems that put them at increased risk. It does not appear by the [facility] information you submitted that you meet any of these criteria. If there are medical problems that we did not know about, please repeat an [facility] visit and let us know what medical conditions you have.   How can I protect others?  Without a test, we can't know for sure that you have COVID-19. For safety, it's very important to follow these rules.  First, stay home and away from others (self-isolate) until:   You've had no fever---and no medicine that reduces fever---for 3 full days (72 hours). And...    Your other symptoms have gotten better. For example, your cough or breathing has improved. And...   At least 10 days have passed since your symptoms started.   During this time:   Don't go to work, school or anywhere else.    Stay away from others in your home. No hugging, kissing or shaking hands.   Don't let anyone visit.   Cover your mouth and nose with a mask, tissue or wash cloth to avoid spreading germs.   Wash your hands and face often. Use soap and water.   How can I take care of myself?   1.Take Tylenol (acetaminophen) for fever or pain. If you have liver or kidney problems, ask your family doctor if it's okay to take Tylenol.        Adults can take either:    650 mg (two 325 mg pills) every 4 to 6 hours, or...   1,000 mg (two 500 mg pills) every 8 hours as needed.    Note: Don't take more than 3,000 mg in one day.   For children, check the Tylenol bottle for the right dose. The dose is based on the child's age or weight.   2.If you have other health problems (like cancer, heart failure, an organ transplant or severe kidney disease): Call your specialty clinic if you don't feel better in the next 2 days.       3.Know when to call 911: If your breathing is so bad that it keeps you from doing normal activities, call 911 or go to the emergency room. Tell them that you've been staying home and may have COVID-19.   Where  can I get more information?  To learn more about COVID-19 and how to care for yourself at home, please visit the CDC website at https://www.cdc.gov/coronavirus/2019-ncov/about/steps-when-sick.html.  For more about your care at [facility], please visit [email or web address]   If you are interested in becoming part of a [facility] clinic trial related to COVID19 please go to [email or web address] for information, if you qualify.     Diagnosis: Cough  Diagnosis ICD: R05

## 2021-01-03 ENCOUNTER — HEALTH MAINTENANCE LETTER (OUTPATIENT)
Age: 36
End: 2021-01-03

## 2021-04-25 ENCOUNTER — HEALTH MAINTENANCE LETTER (OUTPATIENT)
Age: 36
End: 2021-04-25

## 2021-10-10 ENCOUNTER — HEALTH MAINTENANCE LETTER (OUTPATIENT)
Age: 36
End: 2021-10-10

## 2022-05-21 ENCOUNTER — HEALTH MAINTENANCE LETTER (OUTPATIENT)
Age: 37
End: 2022-05-21

## 2022-09-18 ENCOUNTER — HEALTH MAINTENANCE LETTER (OUTPATIENT)
Age: 37
End: 2022-09-18

## 2023-06-04 ENCOUNTER — HEALTH MAINTENANCE LETTER (OUTPATIENT)
Age: 38
End: 2023-06-04